# Patient Record
Sex: MALE | Race: WHITE
[De-identification: names, ages, dates, MRNs, and addresses within clinical notes are randomized per-mention and may not be internally consistent; named-entity substitution may affect disease eponyms.]

---

## 2018-12-30 ENCOUNTER — HOSPITAL ENCOUNTER (EMERGENCY)
Dept: HOSPITAL 57 - BURERS | Age: 38
Discharge: HOME | End: 2018-12-30
Payer: COMMERCIAL

## 2018-12-30 DIAGNOSIS — F17.220: ICD-10-CM

## 2018-12-30 DIAGNOSIS — Z79.51: ICD-10-CM

## 2018-12-30 DIAGNOSIS — J30.9: Primary | ICD-10-CM

## 2018-12-30 PROCEDURE — 93005 ELECTROCARDIOGRAM TRACING: CPT

## 2018-12-30 PROCEDURE — 71046 X-RAY EXAM CHEST 2 VIEWS: CPT

## 2018-12-30 NOTE — RAD
PA AND LATERAL CHEST XRAY:

 

DATE: 12/30/2018.

 

HISTORY: 

Cough and shortness of breath.

 

FINDINGS: 

The cardiac silhouette and pulmonary vasculature are within normal limits.  The lungs remain clear.  
The most inferior aspect of the posterior costophrenic angles are excluded from view.  The osseous st
ructures are intact.

 

IMPRESSION: 

No acute cardiopulmonary process.

 

POS: REJI